# Patient Record
Sex: FEMALE | Race: NATIVE HAWAIIAN OR OTHER PACIFIC ISLANDER
[De-identification: names, ages, dates, MRNs, and addresses within clinical notes are randomized per-mention and may not be internally consistent; named-entity substitution may affect disease eponyms.]

---

## 2019-01-31 ENCOUNTER — HOSPITAL ENCOUNTER (EMERGENCY)
Dept: HOSPITAL 14 - H.ER | Age: 24
Discharge: HOME | End: 2019-01-31
Payer: COMMERCIAL

## 2019-01-31 VITALS — SYSTOLIC BLOOD PRESSURE: 130 MMHG | TEMPERATURE: 98 F | HEART RATE: 70 BPM | DIASTOLIC BLOOD PRESSURE: 70 MMHG

## 2019-01-31 VITALS — RESPIRATION RATE: 16 BRPM | OXYGEN SATURATION: 100 %

## 2019-01-31 DIAGNOSIS — Z88.0: ICD-10-CM
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
## 2019-01-31 DIAGNOSIS — S00.83XA: ICD-10-CM
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
## 2019-01-31 DIAGNOSIS — S01.511A: Primary | ICD-10-CM

## 2019-01-31 NOTE — CT
Date of service: 



01/31/2019



PROCEDURE:  CT MAXILLOFACIAL BONES WITHOUT CONTRAST



HISTORY:

Trauma 



COMPARISON:

None available.



TECHNIQUE:

Contiguous axial CT  images of the maxillofacial bones were obtained. 

Coronal and sagittal reformats were generated.



Radiation dose:



Total exam DLP = 1525.63 mGy-cm.



This CT exam was performed using one or more of the following dose 

reduction techniques: Automated exposure control, adjustment of the 

mA and/or kV according to patient size, and/or use of iterative 

reconstruction technique.



FINDINGS:



NASAL BONES:

Unremarkable.



ORBITS:

Orbits and contents unremarkable.



Globes intact and lenses appropriately located.  There are no 

retrobulbar hemorrhages or collections.  Optic nerves and extraocular 

musculature intact..



PARANASAL SINUSES/ MASTOIDS:

Mild mucosal thickening both maxillary antra left greater than right. 

 Both ostiomeatal complexes appear just barely occluded.  In addition,

 there is mild mucosal thickening in multiple ethmoid air cells of.  

No fluid levels seen to suggest acute hemorrhage or sinusitis. 



The nasal mucosa is hypertrophic bilaterally possibly due to recent 

trauma with secondary reactive edema however the possibility of nasal 

polyps not excluded...



Questionable nondisplaced fracture of the mid nasal septum.  There 

also small left-sided septal bone spur.



MAXILLA:

Maxilla including the anterior maxillary spine intact. 



MANDIBLE/ TEMPOROMANDIBULAR JOINTS:

Unremarkable.



SKULL BASE:

Unremarkable.



TEMPORAL BONES:

Middle ears and mastoid grossly unremarkable.



OTHER FINDINGS:

Questionable mild soft tissue swelling lower and to a lesser degree 

upper lips.



IMPRESSION:

Questionable nondisplaced fracture mid nasal septum.  Small 

left-sided septal bone spur.  Hypertrophic changes of the nasal 

mucosa likely reactive due to recent trauma however possibility of 

nasal polyps not excluded.  Clinical correlation recommended. 



Questionable mild soft tissue swelling lower and to a lesser degree 

upper lips.

## 2019-01-31 NOTE — CT
Date of service: 



01/31/2019



PROCEDURE:  CT HEAD WITHOUT CONTRAST.



HISTORY:

Trauma 



COMPARISON:

Correlation made with concurrent CT scan of the maxillofacial skeleton



TECHNIQUE:

Axial computed tomography images were obtained through the head/brain 

without intravenous contrast.  



Radiation dose:



Total exam DLP = 1525.63 mGy-cm.



This CT exam was performed using one or more of the following dose 

reduction techniques: Automated exposure control, adjustment of the 

mA and/or kV according to patient size, and/or use of iterative 

reconstruction technique.



FINDINGS:



HEMORRHAGE:

No intracranial hemorrhage. 



BRAIN:

No mass effect or edema.  No atrophy or chronic microvascular 

ischemic changes.



VENTRICLES:

Unremarkable. No hydrocephalus. 



CALVARIUM:

Unremarkable.



PARANASAL SINUSES:

Mild mucosal thickening again noted within both maxillary antra and 

multiple ethmoid air cells



MASTOID AIR CELLS:

Unremarkable as visualized. No inflammatory changes.



OTHER FINDINGS:

None.



IMPRESSION:

No acute intracranial hemorrhage. 



Mild mucosal thickening present within both maxillary antra and 

multiple ethmoid air cells.

## 2019-01-31 NOTE — ED PDOC
HPI: General Adult


Time Seen by Provider: 19 10:11


Chief Complaint (Nursing): Trauma


Chief Complaint (Provider): facial/head injury


History Per: Patient


Additional Complaint(s): 


24-year-old female presents with lower lip laceration and head injury status 

post a 25 pound medicine ball falling on her face earlier today while she was at

the gym. Patient states after the ball fell on her she fell backwards hitting 

the back of her head against the ground. She did not sustain loss of 

consciousness but presents with headache and pressure sensation to back of head 

associated with dizziness and nausea. Patient went to urgent care and was told 

to come to ED.





PMD: none





Past Medical History


Reviewed: Historical Data, Nursing Documentation, Vital Signs


Vital Signs: 





                                Last Vital Signs











Temp  97.6 F   19 09:39


 


Pulse  66   19 09:39


 


Resp  16   19 09:39


 


BP  142/76   19 09:39


 


Pulse Ox  100   19 09:39














- Medical History


PMH: Asthma





- Surgical History


Surgical History: No Surg Hx





- Family History


Family History: States: No Known Family Hx





- Living Arrangements


Living Arrangements: With Family





- Social History


Current smoker - smoking cessation education provided: No


Alcohol: Social


Drugs: Denies





- Immunization History


Hx Tetanus Toxoid Vaccination: Yes





- Home Medications


Home Medications: 


                                Ambulatory Orders











 Medication  Instructions  Recorded


 


Amoxicillin/Clavulanate [Augmentin 1 tab PO BID #14 tab 19





875 MG-125 MG]  


 


Cyclobenzaprine [Cyclobenzaprine 10 mg PO TID PRN #20 tab 19





HCl]  


 


Ibuprofen [Motrin Tab] 800 mg PO Q8 PRN #20 tab 19














- Allergies


Allergies/Adverse Reactions: 


                                    Allergies











Allergy/AdvReac Type Severity Reaction Status Date / Time


 


macadamia nut oil Allergy  ANAPHYLAXIS Verified 19 09:41


 


Penicillins Allergy  RASH Verified 19 09:41














Review of Systems


ROS Statement: Except As Marked, All Systems Reviewed And Found Negative


ENT: Positive for: Other (lower lip laceration)


Neurological: Positive for: Dizziness, Other (head injury with no LOC)





Physical Exam





- Reviewed


Nursing Documentation Reviewed: Yes


Vital Signs Reviewed: Yes





- Physical Exam


Appears: Positive for: Well, Non-toxic, No Acute Distress


Head Exam: Positive for: ATRAUMATIC, NORMAL INSPECTION (tenderness to occipital 

region with no palpable contusions or skull deformities)


Skin: Positive for: Normal Color.  Negative for: Rash


Eye Exam: Positive for: Normal appearance


ENT: Positive for: Other (dentition intact with no loose teeth or fractured 

teeth, there is a 2 cm superficial flap laceration noted to mucosal surface of 

lower lip not involving vermilion border. This is not a through and through 

laceration, no lacerations noted to face, minimal active bleeding, no foreign 

body, decreased range of motion of lower mandible due to pain, no bony deformity

noted)


Neck: Positive for: Normal, Painless ROM


Cardiovascular/Chest: Positive for: Chest Non Tender


Respiratory: Positive for: Normal Breath Sounds.  Negative for: Respiratory 

Distress


Extremity: Positive for: Normal ROM


Neurologic/Psych: Positive for: Alert, CNs II-XII (grossly intact), Oriented, 

Gait (steady).  Negative for: Aphasia, Facial Droop





- Laboratory Results


Urine Pregnancy POC: Negative





- ECG


O2 Sat by Pulse Oximetry: 100


Pulse Ox Interpretation: Normal





- Other Rad


  ** CT head


X-Ray: Read By Radiologist


X-Ray Interpretation: no intracranial bleeding





  ** CT facial bones


X-Ray Interpretation: see below





Medical Decision Making


Medical Decision Makin year old with facial and head injury





Plan:


PO tylenol


CT head and facial bones





CT facial bones:  FINDINGS: NASAL BONES: Unremarkable. ORBITS: Orbits and 

contents unremarkable. Globes intact and lenses appropriately located.  There 

are no retrobulbar hemorrhages or collections.  Optic nerves and extraocular 

musculature intact. PARANASAL SINUSES/ MASTOIDS: Mild mucosal thickening both 

maxillary antra left greater than right.  Both ostiomeatal complexes appear just

barely occluded.  In addition, there is mild mucosal thickening in multiple 

ethmoid air cells of.  No fluid levels seen to suggest acute hemorrhage or 

sinusitis. The nasal mucosa is hypertrophic bilaterally possibly due to recent 

trauma with secondary reactive edema however the possibility of nasal polyps not

excluded. Questionable nondisplaced fracture of the mid nasal septum.  There 

also small left-sided septal bone spur. MAXILLA: Maxilla including the anterior 

maxillary spine intact.  MANDIBLE/ TEMPOROMANDIBULAR JOINTS: Unremarkable.


SKULL BASE: Unremarkable. TEMPORAL BONES: Middle ears and mastoid grossly 

unremarkable. OTHER FINDINGS: Questionable mild soft tissue swelling lower and 

to a lesser degree upper lips. IMPRESSION: Questionable nondisplaced fracture 

mid nasal septum.  Small left-sided septal bone spur.  Hypertrophic changes of 

the nasal mucosa likely reactive due to recent trauma however possibility of 

nasal polyps not excluded.  Clinical correlation recommended.  Questionable mild

soft tissue swelling lower and to a lesser degree upper lips.





Patient is aware of CT results, all questions answered.





Laceration involves mucosal surface of lower lip only and is superficial.  No 

sutures indicated.  Patient was given wound care instructions. Prescription for 

Augmentin and Motrin provided. Patient was referred to ear, nose and throat 

specialist for follow up.





Disposition





- Clinical Impression


Clinical Impression: 


 Lip laceration, Facial contusion, Head injury








- Patient ED Disposition


Is Patient to be Admitted: No


Counseled Patient/Family Regarding: Studies Performed, Diagnosis, Need For 

Followup, Rx Given





- Disposition


Referrals: 


Behin,Babak, MD [Staff Provider] - 


Disposition: Routine/Home


Disposition Time: 12:19


Condition: STABLE


Additional Instructions: 


Ice affected area as often as possible. Take prescription meds as directed. 

Follow-up with ENT in 2-3 days.


Prescriptions: 


Amoxicillin/Clavulanate [Augmentin 875 MG-125 MG] 1 tab PO BID #14 tab


Cyclobenzaprine [Cyclobenzaprine HCl] 10 mg PO TID PRN #20 tab


 PRN Reason: Muscle Spasm


Ibuprofen [Motrin Tab] 800 mg PO Q8 PRN #20 tab


 PRN Reason: Pain, Moderate (4-7)


Instructions:  Closed Head Injury, Contusion (DC), Wound Care (DC)


Forms:  [facility] (English)